# Patient Record
Sex: MALE | Race: WHITE | NOT HISPANIC OR LATINO | Employment: PART TIME
[De-identification: names, ages, dates, MRNs, and addresses within clinical notes are randomized per-mention and may not be internally consistent; named-entity substitution may affect disease eponyms.]

---

## 2017-02-09 ENCOUNTER — RECORDS - HEALTHEAST (OUTPATIENT)
Dept: ADMINISTRATIVE | Facility: OTHER | Age: 57
End: 2017-02-09

## 2017-08-23 ENCOUNTER — OFFICE VISIT - HEALTHEAST (OUTPATIENT)
Dept: FAMILY MEDICINE | Facility: CLINIC | Age: 57
End: 2017-08-23

## 2017-08-23 DIAGNOSIS — M25.579 PAIN IN JOINT, ANKLE AND FOOT, UNSPECIFIED LATERALITY: ICD-10-CM

## 2017-08-23 DIAGNOSIS — Z00.00 HEALTH CARE MAINTENANCE: ICD-10-CM

## 2017-08-23 DIAGNOSIS — N52.9 ED (ERECTILE DYSFUNCTION): ICD-10-CM

## 2017-08-23 DIAGNOSIS — E78.2 MIXED HYPERLIPIDEMIA: ICD-10-CM

## 2017-08-23 ASSESSMENT — MIFFLIN-ST. JEOR: SCORE: 1650.04

## 2017-08-25 ENCOUNTER — AMBULATORY - HEALTHEAST (OUTPATIENT)
Dept: LAB | Facility: CLINIC | Age: 57
End: 2017-08-25

## 2017-08-25 DIAGNOSIS — Z00.00 HEALTH CARE MAINTENANCE: ICD-10-CM

## 2017-08-25 LAB
CHOLEST SERPL-MCNC: 233 MG/DL
FASTING STATUS PATIENT QL REPORTED: YES
HDLC SERPL-MCNC: 45 MG/DL
LDLC SERPL CALC-MCNC: 168 MG/DL
PSA SERPL-MCNC: <0.1 NG/ML (ref 0–3.5)
TRIGL SERPL-MCNC: 98 MG/DL

## 2018-02-13 ENCOUNTER — OFFICE VISIT - HEALTHEAST (OUTPATIENT)
Dept: FAMILY MEDICINE | Facility: CLINIC | Age: 58
End: 2018-02-13

## 2018-02-13 ENCOUNTER — RECORDS - HEALTHEAST (OUTPATIENT)
Dept: GENERAL RADIOLOGY | Facility: CLINIC | Age: 58
End: 2018-02-13

## 2018-02-13 DIAGNOSIS — R68.83 CHILLS: ICD-10-CM

## 2018-02-13 DIAGNOSIS — R68.83 CHILLS (WITHOUT FEVER): ICD-10-CM

## 2018-02-13 DIAGNOSIS — M54.9 DORSALGIA, UNSPECIFIED: ICD-10-CM

## 2018-02-13 DIAGNOSIS — M54.9 BACK PAIN: ICD-10-CM

## 2018-02-13 LAB
BASOPHILS # BLD AUTO: 0.2 THOU/UL (ref 0–0.2)
BASOPHILS NFR BLD AUTO: 3 % (ref 0–2)
EOSINOPHIL COUNT (ABSOLUTE): 0.1 THOU/UL (ref 0–0.4)
EOSINOPHIL NFR BLD AUTO: 3 % (ref 0–6)
ERYTHROCYTE [DISTWIDTH] IN BLOOD BY AUTOMATED COUNT: 12.2 % (ref 11–14.5)
ERYTHROCYTE [SEDIMENTATION RATE] IN BLOOD BY WESTERGREN METHOD: 14 MM/HR (ref 0–15)
HCT VFR BLD AUTO: 44.8 % (ref 40–54)
HGB BLD-MCNC: 14.8 G/DL (ref 14–18)
LYMPHOCYTES # BLD AUTO: 1.5 THOU/UL (ref 0.8–4.4)
LYMPHOCYTES NFR BLD AUTO: 28 % (ref 20–40)
MCH RBC QN AUTO: 29.4 PG (ref 27–34)
MCHC RBC AUTO-ENTMCNC: 33 G/DL (ref 32–36)
MCV RBC AUTO: 89 FL (ref 80–100)
MONOCYTES # BLD AUTO: 1 THOU/UL (ref 0–0.9)
MONOCYTES NFR BLD AUTO: 19 % (ref 2–10)
PLAT MORPH BLD: NORMAL
PLATELET # BLD AUTO: 208 THOU/UL (ref 140–440)
PMV BLD AUTO: 11.1 FL (ref 8.5–12.5)
RBC # BLD AUTO: 5.03 MILL/UL (ref 4.4–6.2)
TOTAL NEUTROPHILS-ABS(DIFF): 2.5 THOU/UL (ref 2–7.7)
TOTAL NEUTROPHILS-REL(DIFF): 48 % (ref 50–70)
WBC: 5.3 THOU/UL (ref 4–11)

## 2019-01-31 ENCOUNTER — OFFICE VISIT - HEALTHEAST (OUTPATIENT)
Dept: FAMILY MEDICINE | Facility: CLINIC | Age: 59
End: 2019-01-31

## 2019-01-31 DIAGNOSIS — Z00.00 HEALTH CARE MAINTENANCE: ICD-10-CM

## 2019-01-31 DIAGNOSIS — M25.579 PAIN IN JOINT INVOLVING ANKLE AND FOOT, UNSPECIFIED LATERALITY: ICD-10-CM

## 2019-01-31 DIAGNOSIS — E78.2 MIXED HYPERLIPIDEMIA: ICD-10-CM

## 2019-01-31 DIAGNOSIS — M72.0 DUPUYTREN CONTRACTURE: ICD-10-CM

## 2019-01-31 DIAGNOSIS — Z85.46 HISTORY OF PROSTATE CANCER: ICD-10-CM

## 2019-01-31 DIAGNOSIS — M15.9 GENERALIZED OA: ICD-10-CM

## 2019-01-31 LAB
ERYTHROCYTE [DISTWIDTH] IN BLOOD BY AUTOMATED COUNT: 11.9 % (ref 11–14.5)
HCT VFR BLD AUTO: 46.5 % (ref 40–54)
HGB BLD-MCNC: 15.6 G/DL (ref 14–18)
MCH RBC QN AUTO: 29.4 PG (ref 27–34)
MCHC RBC AUTO-ENTMCNC: 33.5 G/DL (ref 32–36)
MCV RBC AUTO: 88 FL (ref 80–100)
PLATELET # BLD AUTO: 222 THOU/UL (ref 140–440)
PMV BLD AUTO: 8.3 FL (ref 7–10)
RBC # BLD AUTO: 5.31 MILL/UL (ref 4.4–6.2)
WBC: 7 THOU/UL (ref 4–11)

## 2019-01-31 ASSESSMENT — MIFFLIN-ST. JEOR: SCORE: 1656.17

## 2019-02-01 LAB
ALBUMIN SERPL-MCNC: 4 G/DL (ref 3.5–5)
ALP SERPL-CCNC: 79 U/L (ref 45–120)
ALT SERPL W P-5'-P-CCNC: 37 U/L (ref 0–45)
ANION GAP SERPL CALCULATED.3IONS-SCNC: 9 MMOL/L (ref 5–18)
AST SERPL W P-5'-P-CCNC: 23 U/L (ref 0–40)
BILIRUB SERPL-MCNC: 1 MG/DL (ref 0–1)
BUN SERPL-MCNC: 16 MG/DL (ref 8–22)
CALCIUM SERPL-MCNC: 9.7 MG/DL (ref 8.5–10.5)
CHLORIDE BLD-SCNC: 104 MMOL/L (ref 98–107)
CHOLEST SERPL-MCNC: 249 MG/DL
CO2 SERPL-SCNC: 26 MMOL/L (ref 22–31)
CREAT SERPL-MCNC: 1.09 MG/DL (ref 0.7–1.3)
FASTING STATUS PATIENT QL REPORTED: ABNORMAL
GFR SERPL CREATININE-BSD FRML MDRD: >60 ML/MIN/1.73M2
GLUCOSE BLD-MCNC: 83 MG/DL (ref 70–125)
HDLC SERPL-MCNC: 48 MG/DL
LDLC SERPL CALC-MCNC: 173 MG/DL
POTASSIUM BLD-SCNC: 4.5 MMOL/L (ref 3.5–5)
PROT SERPL-MCNC: 7.3 G/DL (ref 6–8)
PSA SERPL-MCNC: <0.1 NG/ML (ref 0–4.5)
SODIUM SERPL-SCNC: 139 MMOL/L (ref 136–145)
TRIGL SERPL-MCNC: 142 MG/DL

## 2019-02-05 ENCOUNTER — AMBULATORY - HEALTHEAST (OUTPATIENT)
Dept: FAMILY MEDICINE | Facility: CLINIC | Age: 59
End: 2019-02-05

## 2019-02-05 RX ORDER — ATORVASTATIN CALCIUM 20 MG/1
20 TABLET, FILM COATED ORAL DAILY
Qty: 30 TABLET | Refills: 11 | Status: SHIPPED | OUTPATIENT
Start: 2019-02-05 | End: 2023-03-29

## 2019-07-15 ENCOUNTER — COMMUNICATION - HEALTHEAST (OUTPATIENT)
Dept: UROLOGY | Facility: CLINIC | Age: 59
End: 2019-07-15

## 2019-07-17 ENCOUNTER — COMMUNICATION - HEALTHEAST (OUTPATIENT)
Dept: UROLOGY | Facility: CLINIC | Age: 59
End: 2019-07-17

## 2020-10-30 ENCOUNTER — OFFICE VISIT - HEALTHEAST (OUTPATIENT)
Dept: FAMILY MEDICINE | Facility: CLINIC | Age: 60
End: 2020-10-30

## 2020-10-30 DIAGNOSIS — G56.03 BILATERAL CARPAL TUNNEL SYNDROME: ICD-10-CM

## 2020-10-30 DIAGNOSIS — M25.50 MULTIPLE JOINT PAIN: ICD-10-CM

## 2020-10-30 DIAGNOSIS — Z12.11 COLON CANCER SCREENING: ICD-10-CM

## 2020-10-30 DIAGNOSIS — E78.2 MIXED HYPERLIPIDEMIA: ICD-10-CM

## 2020-10-30 DIAGNOSIS — Z00.00 ROUTINE HISTORY AND PHYSICAL EXAMINATION OF ADULT: ICD-10-CM

## 2020-10-30 DIAGNOSIS — C61 MALIGNANT NEOPLASM OF PROSTATE (H): ICD-10-CM

## 2020-10-30 DIAGNOSIS — R00.2 PALPITATIONS: ICD-10-CM

## 2020-10-30 LAB
ALBUMIN SERPL-MCNC: 4 G/DL (ref 3.5–5)
ALP SERPL-CCNC: 73 U/L (ref 45–120)
ALT SERPL W P-5'-P-CCNC: 26 U/L (ref 0–45)
ANION GAP SERPL CALCULATED.3IONS-SCNC: 10 MMOL/L (ref 5–18)
AST SERPL W P-5'-P-CCNC: 24 U/L (ref 0–40)
BILIRUB SERPL-MCNC: 0.7 MG/DL (ref 0–1)
BUN SERPL-MCNC: 21 MG/DL (ref 8–22)
CALCIUM SERPL-MCNC: 9.1 MG/DL (ref 8.5–10.5)
CHLORIDE BLD-SCNC: 105 MMOL/L (ref 98–107)
CHOLEST SERPL-MCNC: 219 MG/DL
CO2 SERPL-SCNC: 22 MMOL/L (ref 22–31)
CREAT SERPL-MCNC: 1.1 MG/DL (ref 0.7–1.3)
ERYTHROCYTE [DISTWIDTH] IN BLOOD BY AUTOMATED COUNT: 12 % (ref 11–14.5)
FASTING STATUS PATIENT QL REPORTED: YES
GFR SERPL CREATININE-BSD FRML MDRD: >60 ML/MIN/1.73M2
GLUCOSE BLD-MCNC: 91 MG/DL (ref 70–125)
HCT VFR BLD AUTO: 45.4 % (ref 40–54)
HDLC SERPL-MCNC: 55 MG/DL
HGB BLD-MCNC: 15.3 G/DL (ref 14–18)
LDLC SERPL CALC-MCNC: 142 MG/DL
MCH RBC QN AUTO: 29.6 PG (ref 27–34)
MCHC RBC AUTO-ENTMCNC: 33.7 G/DL (ref 32–36)
MCV RBC AUTO: 88 FL (ref 80–100)
PLATELET # BLD AUTO: 198 THOU/UL (ref 140–440)
PMV BLD AUTO: 8.8 FL (ref 7–10)
POTASSIUM BLD-SCNC: 4.6 MMOL/L (ref 3.5–5)
PROT SERPL-MCNC: 7.2 G/DL (ref 6–8)
PSA SERPL-MCNC: <0.1 NG/ML (ref 0–4.5)
RBC # BLD AUTO: 5.16 MILL/UL (ref 4.4–6.2)
SODIUM SERPL-SCNC: 137 MMOL/L (ref 136–145)
TRIGL SERPL-MCNC: 111 MG/DL
WBC: 5.4 THOU/UL (ref 4–11)

## 2020-10-30 ASSESSMENT — MIFFLIN-ST. JEOR: SCORE: 1624.53

## 2020-11-02 ENCOUNTER — COMMUNICATION - HEALTHEAST (OUTPATIENT)
Dept: FAMILY MEDICINE | Facility: CLINIC | Age: 60
End: 2020-11-02

## 2020-11-03 LAB
ANA SER QL: 0.2 U
DNA (DS) ANTIBODY - HISTORICAL: 3 IU

## 2020-11-11 ENCOUNTER — RECORDS - HEALTHEAST (OUTPATIENT)
Dept: ADMINISTRATIVE | Facility: OTHER | Age: 60
End: 2020-11-11

## 2020-11-11 LAB — COLOGUARD-ABSTRACT: NEGATIVE

## 2020-11-24 ENCOUNTER — COMMUNICATION - HEALTHEAST (OUTPATIENT)
Dept: FAMILY MEDICINE | Facility: CLINIC | Age: 60
End: 2020-11-24

## 2020-11-25 ENCOUNTER — RECORDS - HEALTHEAST (OUTPATIENT)
Dept: HEALTH INFORMATION MANAGEMENT | Facility: CLINIC | Age: 60
End: 2020-11-25

## 2020-12-12 ENCOUNTER — COMMUNICATION - HEALTHEAST (OUTPATIENT)
Dept: FAMILY MEDICINE | Facility: CLINIC | Age: 60
End: 2020-12-12

## 2021-05-30 NOTE — TELEPHONE ENCOUNTER
Message left for patient to call clinic to set up an appointment for kidney stone follow-up for today or tomorrow.  Shannan Gonzalez RN

## 2021-05-31 VITALS — HEIGHT: 72 IN | BODY MASS INDEX: 24.52 KG/M2 | WEIGHT: 181 LBS

## 2021-05-31 VITALS — BODY MASS INDEX: 25.27 KG/M2 | WEIGHT: 185 LBS

## 2021-06-02 VITALS — BODY MASS INDEX: 24.65 KG/M2 | HEIGHT: 72 IN | WEIGHT: 182 LBS

## 2021-06-04 VITALS
WEIGHT: 178 LBS | SYSTOLIC BLOOD PRESSURE: 138 MMHG | RESPIRATION RATE: 14 BRPM | TEMPERATURE: 98.4 F | HEIGHT: 71 IN | DIASTOLIC BLOOD PRESSURE: 78 MMHG | HEART RATE: 54 BPM | BODY MASS INDEX: 24.92 KG/M2

## 2021-06-05 ENCOUNTER — RECORDS - HEALTHEAST (OUTPATIENT)
Dept: LAB | Facility: CLINIC | Age: 61
End: 2021-06-05

## 2021-06-05 DIAGNOSIS — R42 DIZZINESS AND GIDDINESS: ICD-10-CM

## 2021-06-12 NOTE — PROGRESS NOTES
Assessment:      Healthy male exam.    HCM  Hx of prostate cancer  Hyperlipidemia  Joint pain  Carpal tunnel  palpitations  Plan:       All questions answered.    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 male who presents for an annual exam. Pt is here for annual exam. In need of fasting labs.  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  He has problems with carpal tunnel BL often- discussed bracing.  Discussed options of injections or hand surgery as well.  He has had a couple of episodes of palpitations in the past as well- short in duration- maybe some dizziness with the episodes- discussed the need to be seen if this happens so we can get EKG- discussed if the episodes are short but happening more often than a couple times a year we could consider weekly monitor.  Pt is on statin- check cholesterol levels today.  Follow PSA- hx of prostate cancer.    Healthy Habits:   Reular Exercise: Yes  Healthy Diet: Yes  Dental Visits Regularly: Yes  Lipid Profile: Yes  Glucose Screen: Yes        Immunization History   Administered Date(s) Administered     DT (pediatric) 04/18/2000     Td, adult adsorbed, PF 08/16/2018     Tdap 09/24/2008      Immunization status: up to date and documented.    No exam data present    Current Outpatient Medications   Medication Sig Dispense Refill     atorvastatin (LIPITOR) 20 MG tablet Take 1 tablet (20 mg total) by mouth daily. 30 tablet 11     No current facility-administered medications for this visit.      Past Medical History:   Diagnosis Date     Cancer (H)     Prostate     Hyperlipidemia      Psoriasis      Past Surgical History:   Procedure Laterality Date     LAPAROSCOPIC RETROPUBIC PROSTATECTOMY  2015     [place] LAP,PROSTATECTOMY,RADICAL,W/NERVE SPARE,INCL ROBOTIC Bilateral 2015    Procedure: DAVINCI RADICAL RETROPUBIC PROSTATECTOMY, BILATERAL PELVIC LYMPH NODE DISSECTION;  Surgeon: Jelani Landa MD;  [facility];  Service: Urology     Patient has no known allergies.  Family History   Problem Relation Age of Onset     Multiple myeloma Mother      Heart disease Father      Social History     Socioeconomic History     Marital status:      Spouse name: Not on file     Number of children: Not on file     Years of education: Not on file     Highest education level: Not on file   Occupational History     Not on file   Social Needs     Financial resource strain: Not on file     Food insecurity     Worry: Not on file     Inability: Not on file     Transportation needs     Medical: Not on file     Non-medical: Not on file   Tobacco Use     Smoking status: Former Smoker     Quit date: 1994     Years since quittin.8     Smokeless tobacco: Former User   Substance and Sexual Activity     Alcohol use: No     Drug use: Not on file     Sexual activity: Not on file   Lifestyle     Physical activity     Days per week: Not on file     Minutes per session: Not on file     Stress: Not on file   Relationships     Social connections     Talks on phone: Not on file     Gets together: Not on file     Attends Sikh service: Not on file     Active member of club or organization: Not on  "file     Attends meetings of clubs or organizations: Not on file     Relationship status: Not on file     Intimate partner violence     Fear of current or ex partner: Not on file     Emotionally abused: Not on file     Physically abused: Not on file     Forced sexual activity: Not on file   Other Topics Concern     Not on file   Social History Narrative     Not on file       Review of Systems  General:  Denies problem  Eyes: Denies problem  Ears/Nose/Throat: Denies problem  Cardiovascular: Denies problem  Respiratory:  Denies problem  Gastrointestinal:  Denies problem  Genitourinary: Denies problem  Musculoskeletal:  Denies problem  Skin: Denies problem  Neurologic: Denies problem  Psychiatric: Denies problem  Endocrine: Denies problem  Heme/Lymphatic: Denies problem   Allergic/Immunologic: Denies problem        Objective:     Vitals:    10/30/20 0910 10/30/20 0950   BP: 142/90 138/78   Pulse: (!) 54    Resp: 14    Temp: 98.4  F (36.9  C)    TempSrc: Oral    Weight: 178 lb (80.7 kg)    Height: 5' 11\" (1.803 m)      Body mass index is 24.83 kg/m .    Physical  General Appearance: Alert, cooperative, no distress, appears stated age  Head: Normocephalic, without obvious abnormality, atraumatic  Eyes: PERRL, conjunctiva/corneas clear, EOM's intact  Ears: Normal TM's and external ear canals, both ears  Nose: Nares normal, septum midline,mucosa normal, no drainage  Back: Symmetric, no curvature, ROM normal, no CVA tenderness  Lungs: Clear to auscultation bilaterally, respirations unlabored  Heart: Regular rate and rhythm, S1 and S2 normal, no murmur, rub, or gallop,  Abdomen: Soft, non-tender, bowel sounds active all four quadrants,  no masses, no organomegaly   Musculoskeletal: Normal range of motion. No joint swelling or deformity.   Extremities: Extremities normal, atraumatic, no cyanosis or edema  Skin: Skin color, texture, turgor normal, no rashes or lesions  Neurologic: He is alert. He has normal reflexes. "   Psychiatric: He has a normal mood and affect.

## 2021-06-12 NOTE — PROGRESS NOTES
Assessment:      Healthy male exam.    ED  Hyperlipidemia  Hist of prostate cancer  Plan:       All questions answered.   ED-tried multiple medications since his da Kana procedure for prostate cancer and has not had improvement and has ongoing problems with erectile dysfunction is not interested in other medications at this time  Hyperlipidemia-due for fasting labs today he will return when he is fasting will follow based on results  Hist of prostate cancer-he discusses frustrations status post surgical correction of prostate cancer he is due for PSA testing  HCM-is up-to-date on immunizations  He will return for fasting labs  Ankle pain-ongoing problems with ankle pain discuss options for referral to orthopedics for further evaluation and treatment he is not interested at this time he will continue to monitor but is concerned obviously with the variability and presentation of his symptoms are happening in the future causing injury  Subjective:      Jaydon Mancia is a 57 y.o. male who presents for an annual exam. The patient reports that there is not domestic violence in his life.  Patient is here for yearly physical.  Is up-to-date on immunizations.  Is up-to-date on colonoscopy.  He is not fasting and will return for fasting labs.  Is a history of elevated cholesterol levels and will check levels when he returns in follow-up based on results.  He discusses frustration with ongoing problems with erectile dysfunction and is due for PSA testing today has been a year since his last check.  He is overdue.  He continues to have problems with his ankle periodically giving out on him which I was occasionally allowed him to fall-he has tried over the last year strengthening exercises for his ankle without any improvement.  He has time periods where he does not have any symptoms at all and sometimes he has more of a problem with his ankle but has not been a little associated a consistent pattern.  We offer referral to  orthopedic specialty for his ankle to help improve symptoms but he declines at this time will think about it for the future.    Healthy Habits:   Regular Exercise: Yes  Sunscreen Use: Yes  Healthy Diet: Yes  Dental Visits Regularly: Yes  Seat Belt: Yes  Sexually active: No  Colonoscopy: Yes  Lipid Profile: Yes  Glucose Screen: Yes      Immunization History   Administered Date(s) Administered     DT (pediatric) 04/18/2000     Tdap 09/24/2008     Immunization status: up to date and documented.    No exam data present    Current Outpatient Prescriptions   Medication Sig Dispense Refill     tadalafil (CIALIS) 5 MG tablet Take 1 tablet (5 mg total) by mouth daily.       No current facility-administered medications for this visit.      Past Medical History:   Diagnosis Date     Cancer     Prostate     Hyperlipidemia      Psoriasis      Past Surgical History:   Procedure Laterality Date     LAPAROSCOPIC RETROPUBIC PROSTATECTOMY  01/27/2015     [place] LAP,PROSTATECTOMY,RADICAL,W/NERVE SPARE,INCL ROBOTIC Bilateral 1/27/2015    Procedure: DAVINCI RADICAL RETROPUBIC PROSTATECTOMY, BILATERAL PELVIC LYMPH NODE DISSECTION;  Surgeon: Jelani Landa MD;  Location: [facility];  Service: Urology     Review of patient's allergies indicates no known allergies.  No family history on file.  Social History     Social History     Marital status:      Spouse name: N/A     Number of children: N/A     Years of education: N/A     Occupational History     Not on file.     Social History Main Topics     Smoking status: Former Smoker     Quit date: 1/1/1994     Smokeless tobacco: Former User     Alcohol use No     Drug use: Not on file     Sexual activity: Not on file     Other Topics Concern     Not on file     Social History Narrative       Review of Systems  General:  Denies problem  Eyes: Denies problem  Ears/Nose/Throat: Denies problem  Cardiovascular: Denies problem  Respiratory:  Denies problem  Gastrointestinal:  Denies  "problem  Genitourinary: Denies problem  Musculoskeletal:  Denies problem  Skin: Denies problem  Neurologic: Denies problem  Psychiatric: Denies problem  Endocrine: Denies problem  Heme/Lymphatic: Denies problem   Allergic/Immunologic: Denies problem        Objective:     Vitals:    08/23/17 1400   BP: 116/68   Pulse: 62   Resp: 16   Temp: 98.6  F (37  C)   TempSrc: Oral   Weight: 181 lb (82.1 kg)   Height: 5' 11.75\" (1.822 m)     Body mass index is 24.72 kg/(m^2).    Physical  General Appearance: Alert, cooperative, no distress, appears stated age  Head: Normocephalic, without obvious abnormality, atraumatic  Eyes: PERRL, conjunctiva/corneas clear, EOM's intact  Ears: Normal TM's and external ear canals, both ears  Nose: Nares normal, septum midline,mucosa normal, no drainage  Throat: Lips, mucosa, and tongue normal; teeth and gums normal  Neck: Supple, symmetrical, trachea midline, no adenopathy;  thyroid: not enlarged, symmetric, no tenderness/mass/nodules; no carotid bruit or JVD  Back: Symmetric, no curvature, ROM normal, no CVA tenderness  Lungs: Clear to auscultation bilaterally, respirations unlabored  Heart: Regular rate and rhythm, S1 and S2 normal, no murmur, rub, or gallop,  Abdomen: Soft, non-tender, bowel sounds active all four quadrants,  no masses, no organomegaly  Genitourinary: no concerns  Musculoskeletal: Normal range of motion. No joint swelling or deformity.   Extremities: Extremities normal, atraumatic, no cyanosis or edema-no abnormalities noted in ankle examination.  Skin: Skin color, texture, turgor normal, no rashes or lesions  Lymph nodes: Cervical, supraclavicular, and axillary nodes normal  Neurologic: He is alert. He has normal reflexes.   Psychiatric: He has a normal mood and affect.            "

## 2021-06-12 NOTE — PATIENT INSTRUCTIONS - HE
Check with insurance on coverage for the new shingles vaccine  - look for ankle braces for work  - carpal tunnel braces for wrist  -if dizziness spells are increasing contact Dr. Luciano for monitor

## 2021-06-16 NOTE — PROGRESS NOTES
ASSESSMENT/PLAN  1. Back pain  Clinical symptoms and history of sciatica/radiculopathy on the right side from the right SI joint radiating down the leg  No acute trauma the patient expressing symptoms of chills will rule out infection  Hemogram showing no elevation of neutrophils or white blood cell count and sed rate was normal today in clinic  X-ray finding no acute abnormalities on imaging reviewed with the patient today will send to radiology as well  We will start the patient on a course of prednisone 20 mg 2 tablets daily for 4 days and 1 tablet for 6  He will contact me if symptoms are not improving or if there is any worsening or new symptoms that develop  - Sedimentation Rate  - HM1(CBC and Differential)  - XR Hip Right 2 or More VWS; Future  - HM1 (CBC with Diff)  - Manual Differential    2. Chills  Reviewed imaging and laboratory work with him today  We will start prednisone as stated above  Patient does have history of prostate cancer-recent PSA testing within the last 6 months was nondetectable no lesions noted on x-ray imaging will send to radiology for final read  - Sedimentation Rate  - HM1(CBC and Differential)  - XR Hip Right 2 or More VWS; Future  - HM1 (CBC with Diff)  - Manual Differential        SUBJECTIVE:   Chief Complaint   Patient presents with     Sciatica     Right sided pain in the buttocks radiating into the posterior right leg X3 days      Back Pain     Intermittent bilateral lower back pain     Chills     Getting the chills within the past month, last 30-60 minutes at a time     Jaydon Mancia 58 y.o. male    Current Outpatient Prescriptions   Medication Sig Dispense Refill     predniSONE (DELTASONE) 20 MG tablet Take two tablets daily x 4 days then one tablet daily x 6 14 tablet 0     tadalafil (CIALIS) 5 MG tablet Take 1 tablet (5 mg total) by mouth daily.       No current facility-administered medications for this visit.      Allergies: Review of patient's allergies indicates no  known allergies.   No LMP for male patient.    HPI:   Patient presenting with right-sided SI joint and sciatica pain radiating down his right leg with no acute trauma.  He works a physical intensive job in construction but had no acute trauma to his knowledge.  He is in the past has had very low levels of back pain prior with no relief similar symptoms.  He has been experiencing a large amount of pain over the last few days and getting chills as well.  No fevers.  Has a history of prostate cancer recently at less than 6 months ago and had an undetectable PSA level.  Discussed with him multiple different possible etiologies leading to the back pain with radiculopathy.  With his chills and sudden onset discussed with even the possibility of infection.  We discussed laboratory work today which is showing no acute signs of infection.  We will obtain imaging which were reviewed with him showing no acute lytic lesions or malalignment of the hip.  We discussed with him a trial course of prednisone to help with inflammatory changes causing symptoms which is in agreement.  We will start him on prednisone 40 mg for 4 days and then 20 mg for 6 days after that.  If no improvement of symptoms will need to further evaluate likely with MRI.  Discussed with any change in symptoms or new symptoms developing to contact me sooner.  Discussed with patient possible need for pain medication which he declined today.    ROS: negative except as per HPI    OBJECTIVE:   The patient appears well, alert, oriented x 3, in no distress.  /70 (Patient Site: Right Arm, Patient Position: Sitting, Cuff Size: Adult Regular)  Pulse 74  Temp 98.3  F (36.8  C) (Oral)   Resp 14  Wt 185 lb (83.9 kg)  BMI 25.27 kg/m2    Lungs: clear, good air entry, no wheezes, rhonchi or rales.   Cardiac: S1 and S2 normal, no murmurs, regular rate and rhythm.   Abdomen: normal bowel sounds, soft   Extremities: show no edema, normal peripheral pulses.    Neurological: Patient sitting more comfortably than standing with stand has right-sided radiculopathy and sciatica from his SI joint down-he has normal range of motion his right lower extremity  Skin: clear, dry, no rashes/lesions  Psych- normal mood and affect      Pt states an understanding and agrees to the above plan.  Greater than 25 minutes was spent today in interview and examination with Jaydon Mancia with more than 50% of that time in counseling and coordination of care.

## 2021-06-21 NOTE — LETTER
Letter by Andriy Rider MD at      Author: Andriy Rider MD Service: -- Author Type: --    Filed:  Encounter Date: 11/24/2020 Status: (Other)         Jaydon Mancia  [address] [place] [address]             November 24, 2020         Dear Mr. Mancia,    Below are the results from your recent visit:    No results found from the In Basket message.      Please call with questions or contact us using [employer].    Sincerely,        Electronically signed by Andriy Rider MD

## 2021-06-21 NOTE — LETTER
Letter by Andriy Rider MD at      Author: Andriy Rider MD Service: -- Author Type: --    Filed:  Encounter Date: 11/24/2020 Status: (Other)         Jaydon Mancia  [address] [place] [id number]             November 25, 2020         Dear Mr. Mancia,    Below are the results from your recent visit:    Your Cologuard testing of a stool sample collected 11-11-20 is NEGATIVE.  Cologuard testing is recommended every 3 years.    Please call with questions or contact us using [employer].    Sincerely,        Electronically signed by Andriy Rider MD

## 2021-06-23 NOTE — PROGRESS NOTES
Assessment:      Healthy male exam.    [facility]  Dupuytren contracture  Mixed hyperlipidemia  OA- ankle pain  Hand pain  Hx of prostate cancer  Plan:       All questions answered.    [facility]-discussed the new shingles vaccine, patient up-to-date on colon cancer screening, is fasting today and interested in fasting labs  Dupuytren contracture-patient has a contracture on his fourth digit discussed diagnosis and stretching techniques-discussed possibility of surgery for correction if needed  Mixed hyperlipidemia-discussed patient's elevated cholesterol levels, reviewed his 10-year cardiovascular risk-discussed starting statin medication if levels are not improved  OA- ankle pain-patient has suspected osteoarthritis of the ankles and lax ligaments that sometimes allow his ankles to roll discussed with him ankle bracing 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 male who presents for an annual exam. The patient reports that there is not domestic violence in his life.  Patient is here for annual exam-he has a few other concerns including his ankles that sometimes give out on him, ankle pain and hand pain.  He has signs of osteoarthritis in his hands and ankles.  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  Reviewed his elevated cholesterol levels last couple years and his 10-year cardiovascular risk which is up at about  12%.  We discussed with him starting a statin medication of his cholesterol levels do not improve.  Is a strong family history of coronary artery disease-I think he would be amendable to starting a statin if his cholesterol levels remain elevated.  He is no longer following with urology has a history of prostate cancer and prostatectomy a few years ago we are checking PSA for monitoring.  He is up-to-date on immunizations  Discussed the new shingles vaccine  Discussed his previous colonoscopy results and the possibility of doing Cologuard in the future    Healthy Habits:   Regular Exercise: Yes  Sunscreen Use: Yes  Healthy Diet: Yes  Dental Visits Regularly: Yes  Seat Belt: Yes  Colonoscopy: Yes  Lipid Profile: Yes  Glucose Screen: Yes        Immunization History   Administered Date(s) Administered     DT (pediatric) 04/18/2000     Td, adult adsorbed, PF 08/16/2018     Tdap 09/24/2008     Immunization status: up to date and documented.    No exam data present    No current outpatient medications on file.     No current facility-administered medications for this visit.      Past Medical History:   Diagnosis Date     Cancer (H)     Prostate     Hyperlipidemia      Psoriasis      Past Surgical History:   Procedure Laterality Date     LAPAROSCOPIC RETROPUBIC PROSTATECTOMY  01/27/2015     SC LAP,PROSTATECTOMY,RADICAL,W/NERVE SPARE,INCL ROBOTIC Bilateral 1/27/2015    Procedure: DAVINCI RADICAL RETROPUBIC PROSTATECTOMY, BILATERAL PELVIC LYMPH NODE DISSECTION;  Surgeon: Jelani Landa MD;  Location: [facility];  Service: Urology     Patient has no known allergies.  Family History   Problem Relation Age of Onset     Multiple myeloma Mother      Heart disease Father      Social History     Socioeconomic History     Marital status:      Spouse name: Not on file     Number of children: Not on file     Years of education: Not on file     Highest education level: Not on file   Social Needs     Financial  "resource strain: Not on file     Food insecurity - worry: Not on file     Food insecurity - inability: Not on file     Transportation needs - medical: Not on file     Transportation needs - non-medical: Not on file   Occupational History     Not on file   Tobacco Use     Smoking status: Former Smoker     Last attempt to quit: 1994     Years since quittin.0     Smokeless tobacco: Former User   Substance and Sexual Activity     Alcohol use: No     Drug use: Not on file     Sexual activity: Not on file   Other Topics Concern     Not on file   Social History Narrative     Not on file       Review of Systems  General:  Denies problem  Eyes: Denies problem  Ears/Nose/Throat: Denies problem  Cardiovascular: Denies problem  Respiratory:  Denies problem  Gastrointestinal:  Denies problem  Genitourinary: Denies problem  Musculoskeletal:  Denies problem  Skin: Denies problem  Neurologic: Denies problem  Psychiatric: Denies problem  Endocrine: Denies problem  Heme/Lymphatic: Denies problem   Allergic/Immunologic: Denies problem        Objective:     Vitals:    19 0902 19 0909   BP: 140/84 129/85   Pulse: 62 60   Resp: 16    Temp: 97.7  F (36.5  C)    TempSrc: Oral    Weight: 182 lb (82.6 kg)    Height: 5' 11.85\" (1.825 m)      Body mass index is 24.79 kg/m .    Physical  General Appearance: Alert, cooperative, no distress, appears stated age  Head: Normocephalic, without obvious abnormality, atraumatic  Eyes: PERRL, conjunctiva/corneas clear, EOM's intact  Ears: Normal TM's and external ear canals, both ears  Nose: Nares normal, septum midline,mucosa normal, no drainage  Throat: Lips, mucosa, and tongue normal; teeth and gums normal  Neck: Supple, symmetrical, trachea midline, no adenopathy;  thyroid: not enlarged, symmetric, no tenderness/mass/nodules; no carotid bruit or JVD  Back: Symmetric, no curvature, ROM normal, no CVA tenderness  Lungs: Clear to auscultation bilaterally, respirations " unlabored  Heart: Regular rate and rhythm, S1 and S2 normal, no murmur, rub, or gallop,  Abdomen: Soft, non-tender, bowel sounds active all four quadrants,  no masses, no organomegaly  Genitourinary: deferred, no concerns   Musculoskeletal: Normal range of motion. No joint swelling or deformity.   Extremities: Extremities normal, atraumatic, no cyanosis or edema-bilateral hand pain and Heberden nodules noted, left hand fourth digit with contracture noted  Skin: Skin color, texture, turgor normal, no rashes or lesions  Lymph nodes: Cervical, supraclavicular, and axillary nodes normal  Neurologic: He is alert. He has normal reflexes.   Psychiatric: He has a normal mood and affect.

## 2021-06-26 ENCOUNTER — HEALTH MAINTENANCE LETTER (OUTPATIENT)
Age: 61
End: 2021-06-26

## 2021-10-16 ENCOUNTER — HEALTH MAINTENANCE LETTER (OUTPATIENT)
Age: 61
End: 2021-10-16

## 2021-12-11 ENCOUNTER — HEALTH MAINTENANCE LETTER (OUTPATIENT)
Age: 61
End: 2021-12-11

## 2022-05-05 ENCOUNTER — TRANSFERRED RECORDS (OUTPATIENT)
Dept: HEALTH INFORMATION MANAGEMENT | Facility: CLINIC | Age: 62
End: 2022-05-05
Payer: COMMERCIAL

## 2022-05-12 PROBLEM — D12.6 COLONIC ADENOMA: Status: ACTIVE | Noted: 2022-05-12

## 2022-09-25 ENCOUNTER — HEALTH MAINTENANCE LETTER (OUTPATIENT)
Age: 62
End: 2022-09-25

## 2023-03-29 ENCOUNTER — OFFICE VISIT (OUTPATIENT)
Dept: FAMILY MEDICINE | Facility: CLINIC | Age: 63
End: 2023-03-29
Payer: COMMERCIAL

## 2023-03-29 VITALS
OXYGEN SATURATION: 99 % | SYSTOLIC BLOOD PRESSURE: 133 MMHG | BODY MASS INDEX: 24.72 KG/M2 | RESPIRATION RATE: 14 BRPM | HEART RATE: 62 BPM | HEIGHT: 71 IN | DIASTOLIC BLOOD PRESSURE: 83 MMHG | WEIGHT: 176.6 LBS

## 2023-03-29 DIAGNOSIS — Z85.46 HISTORY OF PROSTATE CANCER: ICD-10-CM

## 2023-03-29 DIAGNOSIS — D12.6 COLONIC ADENOMA: ICD-10-CM

## 2023-03-29 DIAGNOSIS — Z00.00 ENCOUNTER FOR ANNUAL WELLNESS EXAM IN MEDICARE PATIENT: Primary | ICD-10-CM

## 2023-03-29 DIAGNOSIS — Z23 NEED FOR VACCINATION FOR STREP PNEUMONIAE: ICD-10-CM

## 2023-03-29 LAB
ALBUMIN SERPL BCG-MCNC: 4.5 G/DL (ref 3.5–5.2)
ALP SERPL-CCNC: 76 U/L (ref 40–129)
ALT SERPL W P-5'-P-CCNC: 42 U/L (ref 10–50)
ANION GAP SERPL CALCULATED.3IONS-SCNC: 10 MMOL/L (ref 7–15)
AST SERPL W P-5'-P-CCNC: 29 U/L (ref 10–50)
BILIRUB SERPL-MCNC: 0.7 MG/DL
BUN SERPL-MCNC: 20.2 MG/DL (ref 8–23)
CALCIUM SERPL-MCNC: 9.3 MG/DL (ref 8.8–10.2)
CHLORIDE SERPL-SCNC: 106 MMOL/L (ref 98–107)
CHOLEST SERPL-MCNC: 218 MG/DL
CREAT SERPL-MCNC: 1.18 MG/DL (ref 0.67–1.17)
DEPRECATED HCO3 PLAS-SCNC: 23 MMOL/L (ref 22–29)
ERYTHROCYTE [DISTWIDTH] IN BLOOD BY AUTOMATED COUNT: 12.6 % (ref 10–15)
GFR SERPL CREATININE-BSD FRML MDRD: 68 ML/MIN/1.73M2
GLUCOSE SERPL-MCNC: 101 MG/DL (ref 70–99)
HCT VFR BLD AUTO: 45.1 % (ref 40–53)
HDLC SERPL-MCNC: 48 MG/DL
HGB BLD-MCNC: 15.5 G/DL (ref 13.3–17.7)
LDLC SERPL CALC-MCNC: 150 MG/DL
MCH RBC QN AUTO: 29.6 PG (ref 26.5–33)
MCHC RBC AUTO-ENTMCNC: 34.4 G/DL (ref 31.5–36.5)
MCV RBC AUTO: 86 FL (ref 78–100)
NONHDLC SERPL-MCNC: 170 MG/DL
PLATELET # BLD AUTO: 253 10E3/UL (ref 150–450)
POTASSIUM SERPL-SCNC: 4.4 MMOL/L (ref 3.4–5.3)
PROT SERPL-MCNC: 7.5 G/DL (ref 6.4–8.3)
PSA SERPL DL<=0.01 NG/ML-MCNC: 0.02 NG/ML (ref 0–4.5)
RBC # BLD AUTO: 5.23 10E6/UL (ref 4.4–5.9)
SODIUM SERPL-SCNC: 139 MMOL/L (ref 136–145)
TRIGL SERPL-MCNC: 102 MG/DL
WBC # BLD AUTO: 6.2 10E3/UL (ref 4–11)

## 2023-03-29 PROCEDURE — 84153 ASSAY OF PSA TOTAL: CPT | Performed by: FAMILY MEDICINE

## 2023-03-29 PROCEDURE — 85027 COMPLETE CBC AUTOMATED: CPT | Performed by: FAMILY MEDICINE

## 2023-03-29 PROCEDURE — 99397 PER PM REEVAL EST PAT 65+ YR: CPT | Mod: 25 | Performed by: FAMILY MEDICINE

## 2023-03-29 PROCEDURE — 90677 PCV20 VACCINE IM: CPT | Performed by: FAMILY MEDICINE

## 2023-03-29 PROCEDURE — 90471 IMMUNIZATION ADMIN: CPT | Performed by: FAMILY MEDICINE

## 2023-03-29 PROCEDURE — 80053 COMPREHEN METABOLIC PANEL: CPT | Performed by: FAMILY MEDICINE

## 2023-03-29 PROCEDURE — 36415 COLL VENOUS BLD VENIPUNCTURE: CPT | Performed by: FAMILY MEDICINE

## 2023-03-29 PROCEDURE — 80061 LIPID PANEL: CPT | Performed by: FAMILY MEDICINE

## 2023-03-29 ASSESSMENT — ENCOUNTER SYMPTOMS: DIZZINESS: 1

## 2023-03-29 ASSESSMENT — ACTIVITIES OF DAILY LIVING (ADL): CURRENT_FUNCTION: NO ASSISTANCE NEEDED

## 2023-03-29 NOTE — LETTER
March 29, 2023      Jaydon Mancia  [address] [place] [id number]        Dear ,  We are writing to inform you of your test results.    Hi Fito:  Your PSA is 0.02 which is very low though still detectable.  In the past the numbers had been reported as <0.1 and then last year as <0.03.  With the reporting now of very low numbers I would consider a recheck of the PSA in 3 months to verify stability of the PSA.  I will enter the lab order.    With a cholesterol of 218 and  you could consider resumption of statin therapy because of your strong family history of heart disease. Let me know if you would like to do that.  We would then recheck fasting lipids and a liver enzyme in 8 weeks.    Your remaining labs are normal.    Resulted Orders   Comprehensive metabolic panel (BMP + Alb, Alk Phos, ALT, AST, Total. Bili, TP)   Result Value Ref Range    Sodium 139 136 - 145 mmol/L    Potassium 4.4 3.4 - 5.3 mmol/L    Chloride 106 98 - 107 mmol/L    Carbon Dioxide (CO2) 23 22 - 29 mmol/L    Anion Gap 10 7 - 15 mmol/L    Urea Nitrogen 20.2 8.0 - 23.0 mg/dL    Creatinine 1.18 (H) 0.67 - 1.17 mg/dL    Calcium 9.3 8.8 - 10.2 mg/dL    Glucose 101 (H) 70 - 99 mg/dL    Alkaline Phosphatase 76 40 - 129 U/L    AST 29 10 - 50 U/L    ALT 42 10 - 50 U/L    Protein Total 7.5 6.4 - 8.3 g/dL    Albumin 4.5 3.5 - 5.2 g/dL    Bilirubin Total 0.7 <=1.2 mg/dL    GFR Estimate 68 >60 mL/min/1.73m2      Comment:      eGFR calculated using 2021 CKD-EPI equation.   Lipid panel   Result Value Ref Range    Cholesterol 218 (H) <200 mg/dL    Triglycerides 102 <150 mg/dL    Direct Measure HDL 48 >=40 mg/dL    LDL Cholesterol Calculated 150 (H) <=100 mg/dL    Non HDL Cholesterol 170 (H) <130 mg/dL    Narrative    Cholesterol  Desirable:  <200 mg/dL    Triglycerides  Normal:  Less than 150 mg/dL  Borderline High:  150-199 mg/dL  High:  200-499 mg/dL  Very High:  Greater than or equal to 500 mg/dL    Direct Measure HDL  Female:   Greater than or equal to 50 mg/dL   Male:  Greater than or equal to 40 mg/dL    LDL Cholesterol  Desirable:  <100mg/dL  Above Desirable:  100-129 mg/dL   Borderline High:  130-159 mg/dL   High:  160-189 mg/dL   Very High:  >= 190 mg/dL    Non HDL Cholesterol  Desirable:  130 mg/dL  Above Desirable:  130-159 mg/dL  Borderline High:  160-189 mg/dL  High:  190-219 mg/dL  Very High:  Greater than or equal to 220 mg/dL   CBC with platelets   Result Value Ref Range    WBC Count 6.2 4.0 - 11.0 10e3/uL    RBC Count 5.23 4.40 - 5.90 10e6/uL    Hemoglobin 15.5 13.3 - 17.7 g/dL    Hematocrit 45.1 40.0 - 53.0 %    MCV 86 78 - 100 fL    MCH 29.6 26.5 - 33.0 pg    MCHC 34.4 31.5 - 36.5 g/dL    RDW 12.6 10.0 - 15.0 %    Platelet Count 253 150 - 450 10e3/uL   PSA, tumor marker   Result Value Ref Range    PSA Tumor Marker 0.02 0.00 - 4.50 ng/mL    Narrative    This result is obtained using the Roche Elecsys total PSA method on the alva e801 immunoassay analyzer. Results obtained with different assay methods or kits cannot be used interchangeably.       If you have any questions or concerns, please call the clinic at the number listed above.       Sincerely,      Andriy Rider MD

## 2023-03-29 NOTE — PROGRESS NOTES
"SUBJECTIVE:   Jaydon is a 63 year old who presents for Preventive Visit.  Additional Questions 3/29/2023   Roomed by Angelica Payne CMA   Accompanied by N/A   Patient has been advised of split billing requirements and indicates understanding: Yes  Are you in the first 12 months of your Medicare coverage?  Yes,  Visual Acuity:  Right Eye: 20/20   Left Eye: 20/25      Healthy Habits:     In general, how would you rate your overall health?  Good    Frequency of exercise:  2-3 days/week    Duration of exercise:  30-45 minutes    Do you usually eat at least 4 servings of fruit and vegetables a day, include whole grains    & fiber and avoid regularly eating high fat or \"junk\" foods?  No    Taking medications regularly:  Yes    Barriers to taking medications:  Not applicable    Medication side effects:  None    Ability to successfully perform activities of daily living:  No assistance needed    Home Safety:  No safety concerns identified    Hearing Impairment:  Difficulty following a conversation in a noisy restaurant or crowded room, need to ask people to speak up or repeat themselves and difficulty understanding soft or whispered speech    In the past 6 months, have you been bothered by leaking of urine? Yes    In general, how would you rate your overall mental or emotional health?  Excellent      PHQ-2 Total Score: 0    Additional concerns today:  No      Have you ever done Advance Care Planning? (For example, a Health Directive, POLST, or a discussion with a medical provider or your loved ones about your wishes): Yes, patient states has an Advance Care Planning document and will bring a copy to the clinic.       Fall risk  Fallen 2 or more times in the past year?: No  Any fall with injury in the past year?: No    Cognitive Screening   1) Repeat 3 items (Leader, Season, Table)    2) Clock draw: NORMAL  3) 3 item recall: Recalls 3 objects  Results: 3 items recalled: COGNITIVE IMPAIRMENT LESS LIKELY    Mini-CogTM " Copyright GUILLERMO Schmidt. Licensed by the author for use in [facility]; reprinted with permission (anisa@[email or web address]). All rights reserved.      Do you have sleep apnea, excessive snoring or daytime drowsiness?: no    Reviewed and updated as needed this visit by clinical staff   Tobacco  Allergies  Meds              Reviewed and updated as needed this visit by Provider                 Social History     Tobacco Use     Smoking status: Former     Types: Cigarettes     Quit date: 1994     Years since quittin.2     Smokeless tobacco: Former   Substance Use Topics     Alcohol use: No         Alcohol Use 3/29/2023   Prescreen: >3 drinks/day or >7 drinks/week? No   No flowsheet data found.  Do you have a current opioid prescription? No  Do you use any other controlled substances or medications that are not prescribed by a provider? None          Current providers sharing in care for this patient include:   Patient Care Team:  Andriy Rider MD as PCP - General (Family Practice)  Hector Luciano MD as Assigned PCP    The following health maintenance items are reviewed in Epic and correct as of today:  Health Maintenance   Topic Date Due     HIV SCREENING  Never done     HEPATITIS C SCREENING  Never done     ZOSTER IMMUNIZATION (1 of 2) Never done     COVID-19 Vaccine (3 - Booster for Pfizer series) 2021     INFLUENZA VACCINE (1) Never done     MEDICARE ANNUAL WELLNESS VISIT  2023     Pneumococcal Vaccine: 65+ Years (1 - PCV) 2023     ANNUAL REVIEW OF HM ORDERS  2024     FALL RISK ASSESSMENT  2024     LIPID  10/30/2025     COLORECTAL CANCER SCREENING  2027     ADVANCE CARE PLANNING  2028     DTAP/TDAP/TD IMMUNIZATION (3 - Td or Tdap) 2028     PHQ-2 (once per calendar year)  Completed     AORTIC ANEURYSM SCREENING (SYSTEM ASSIGNED)  Completed     IPV IMMUNIZATION  Aged Out     MENINGITIS IMMUNIZATION  Aged Out           Review of Systems   HENT: Positive  "for hearing loss.    Cardiovascular: Positive for peripheral edema.   Genitourinary: Positive for impotence.   Neurological: Positive for dizziness.         OBJECTIVE:   /83 (BP Location: Left arm, Patient Position: Sitting, Cuff Size: Adult Regular)   Pulse 62   Resp 14   Ht 1.803 m (5' 11\")   Wt 80.1 kg (176 lb 9.6 oz)   SpO2 99%   BMI 24.63 kg/m   Estimated body mass index is 24.63 kg/m  as calculated from the following:    Height as of this encounter: 1.803 m (5' 11\").    Weight as of this encounter: 80.1 kg (176 lb 9.6 oz).  Physical Exam  GENERAL: healthy, alert and no distress  EYES: Eyes grossly normal to inspection, PERRL and conjunctivae and sclerae normal  HENT: ear canals and TM's normal, nose and mouth without ulcers or lesions  NECK: no adenopathy, no asymmetry, masses, or scars and thyroid normal to palpation  RESP: lungs clear to auscultation - no rales, rhonchi or wheezes  CV: regular rate and rhythm, normal S1 S2, no S3 or S4, no murmur, click or rub, no peripheral edema and peripheral pulses strong  ABDOMEN: soft, nontender, no hepatosplenomegaly, no masses and bowel sounds normal  RECTAL: deferred  MS: no gross musculoskeletal defects noted, no edema  SKIN: no suspicious lesions or rashes  NEURO: Normal strength and tone, mentation intact and speech normal  PSYCH: mentation appears normal, affect normal/bright    Prostate Specific Antigen Screen   Date Value Ref Range Status   10/30/2020 <0.1 0.0 - 4.5 ng/mL Final       ASSESSMENT / PLAN:       ICD-10-CM    1. Encounter for annual wellness exam in Medicare patient  Z00.00 REVIEW OF HEALTH MAINTENANCE PROTOCOL ORDERS     Comprehensive metabolic panel (BMP + Alb, Alk Phos, ALT, AST, Total. Bili, TP)     Lipid panel     CBC with platelets      2. History of prostate cancer, stable Z85.46 PSA, tumor marker      3. Need for vaccination for Strep pneumoniae  Z23 PNEUMOCOCCAL 20 VALENT CONJUGATE (PREVNAR 20)      4. Colonic adenoma, " colonoscopy due in May 2027 D12.6         PLAN:   .     Check with insurance on Shingrix coverage    Prevnar 20 today    Fasting labs    Consider resumption of a statin due to family hx of ASCAD.  We will see what the lipid panel shows.     Colonoscopy due in May 2027    COUNSELING:  Reviewed preventive health counseling, as reflected in patient instructions       Regular exercise       Healthy diet/nutrition       Colon cancer screening       Prostate cancer screening        He reports that he quit smoking about 29 years ago. He has quit using smokeless tobacco.      Appropriate preventive services were discussed with this patient, including applicable screening as appropriate for cardiovascular disease, diabetes, osteopenia/osteoporosis, and glaucoma.  As appropriate for age/gender, discussed screening for colorectal cancer, prostate cancer, breast cancer, and cervical cancer. Checklist reviewing preventive services available has been given to the patient.    Reviewed patients plan of care and provided an AVS. The Basic Care Plan (routine screening as documented in Health Maintenance) for Jaydon meets the Care Plan requirement. This Care Plan has been established and reviewed with the Patient.      Andriy Rider MD  [facility]    Identified Health Risks:      The patient was counseled and encouraged to consider modifying their diet and eating habits. He was provided with information on recommended healthy diet options.  The patient was provided with written information regarding signs of hearing loss.  Information on urinary incontinence and treatment options given to patient.

## 2023-03-29 NOTE — PATIENT INSTRUCTIONS
Check with insurance on Shingrix coverage    Prevnar 20 today    Fasting labs    Consider resumption of a statin due to family hx of ASCAD.  We will see what the lipid panel shows.  Patient Education   Personalized Prevention Plan  You are due for the preventive services outlined below.  Your care team is available to assist you in scheduling these services.  If you have already completed any of these items, please share that information with your care team to update in your medical record.  Health Maintenance Due   Topic Date Due     HIV Screening  Never done     Hepatitis C Screening  Never done     Zoster (Shingles) Vaccine (1 of 2) Never done     COVID-19 Vaccine (3 - Booster for Pfizer series) 12/16/2021     Flu Vaccine (1) Never done     Annual Wellness Visit  01/27/2023       Understanding USDA MyPlate  The USDA has guidelines to help you make healthy food choices. These are called MyPlate. MyPlate shows the food groups that make up healthy meals using the image of a place setting. Before you eat, think about the healthiest choices for what to put on your plate or in your cup or bowl. To learn more about building a healthy plate, visit www.choosemyplate.gov.     The food groups    Fruits. Any fruit or 100% fruit juice counts as part of the Fruit Group. Fruits may be fresh, canned, frozen, or dried, and may be whole, cut-up, or pureed. Make 1/2 of your plate fruits and vegetables.    Vegetables. Any vegetable or 100% vegetable juice counts as a member of the Vegetable Group. Vegetables may be fresh, frozen, canned, or dried. They can be served raw or cooked and may be whole, cut-up, or mashed. Make 1/2 of your plate fruits and vegetables.    Grains. All foods made from grains are part of the Grains Group. These include wheat, rice, oats, cornmeal, and barley. Grains are often used to make foods such as bread, pasta, oatmeal, cereal, tortillas, and grits. Grains should be no more than 1/4 of your plate. At  least half of your grains should be whole grains.    Protein. This group includes meat, poultry, seafood, beans and peas, eggs, processed soy products (such as tofu), nuts (including nut butters), and seeds. Make protein choices no more than 1/4 of your plate. Meat and poultry choices should be lean or low fat.    Dairy. The Dairy Group includes all fluid milk products and foods made from milk that contain calcium, such as yogurt and cheese. (Foods that have little calcium, such as cream, butter, and cream cheese, are not part of this group.) Most dairy choices should be low-fat or fat-free.    Oils. Oils aren't a food group, but they do contain essential nutrients. However it's important to watch your intake of oils. These are fats that are liquid at room temperature. They include canola, corn, olive, soybean, vegetable, and sunflower oil. Foods that are mainly oil include mayonnaise, certain salad dressings, and soft margarines. You likely already get your daily oil allowance from the foods you eat.  Things to limit  Eating healthy also means limiting these things in your diet:    Salt (sodium). Many processed foods have a lot of sodium. To keep sodium intake down, eat fresh vegetables, meats, poultry, and seafood when possible. Purchase low-sodium, reduced-sodium, or no-salt-added food products at the store. And don't add salt to your meals at home. Instead, season them with herbs and spices such as dill, oregano, cumin, and paprika. Or try adding flavor with lemon or lime zest and juice.    Saturated fat. Saturated fats are most often found in animal products such as beef, pork, and chicken. They are often solid at room temperature, such as butter. To reduce your saturated fat intake, choose leaner cuts of meat and poultry. And try healthier cooking methods such as grilling, broiling, roasting, or baking. For a simple lower-fat swap, use plain nonfat yogurt instead of mayonnaise when making potato salad or  macaroni salad.    Added sugars. These are sugars added to foods. They are in foods such as ice cream, candy, soda, fruit drinks, sports drinks, energy drinks, cookies, pastries, jams, and syrups. Cut down on added sugars by sharing sweet treats with a family member or friend. You can also choose fruit for dessert, and drink water or other unsweetened beverages.  [employer] last reviewed this educational content on 6/1/2020 2000-2022 [employer]. All rights reserved. This information is not intended as a substitute for professional medical care. Always follow your healthcare professional's instructions.          Signs of Hearing Loss  Hearing loss is a problem shared by many people. In fact, it's one of the most common health problems, particularly as people age. Most people aged 65 and older have some hearing loss. By age 80, almost everyone does. Hearing loss often occurs slowly over the years. So, you may not realize your hearing has gotten worse.   When sudden hearing loss occurs, it's important to contact your healthcare provider right away. Your provider will do a medical exam and a hearing exam as soon as possible. This is to help find the cause and type of your sudden hearing loss. Based on your diagnosis, your healthcare provider will discuss possible treatments.      Hearing much better with one ear can be a sign of hearing loss.     Have your hearing checked  Call your healthcare provider if you:     Have to strain to hear normal conversation    Have to watch other people s faces very carefully to follow what they re saying    Need to ask people to repeat what they ve said    Often misunderstand what people are saying    Turn the volume of the television or radio up so high that others complain    Feel that people are mumbling when they re talking to you    Find that the effort to hear leaves you feeling tired and irritated    Notice, when using the phone, that you hear better with one ear  than the other  [employer] last reviewed this educational content on 6/1/2022 2000-2022 [employer]. All rights reserved. This information is not intended as a substitute for professional medical care. Always follow your healthcare professional's instructions.          Urinary Incontinence (Male)  We understand that gender is a spectrum. We may use gendered terms to talk about anatomy and health risk. Please use this sheet in a way that works best for you and your provider as you talk about your care.     Urinary incontinence means not being able to control the release of urine from the bladder.   Causes  Common causes of urinary incontinence in men include:    Infection    Certain medicines    Aging    Poor pelvic muscle tone    Bladder spasms    Obesity    Enlarged prostate    Trouble urinating and fully emptying the bladder (urinary retention)  Other things that can cause incontinence are:     Nervous system diseases    Diabetes    Sleep apnea    Urinary tract infections    Prostate surgery    Pelvic injury  Constipation and smoking have also been identified as risk factors.   Symptoms    Urge incontinence (overactive bladder). This is a sudden urge to urinate. It occurs even though there may not be much urine in the bladder. The need to urinate often during the night is common. It's due to overactive bladder muscles.    Stress incontinence. This is urine leakage that you can't control. It can occur with sneezing, coughing, and other actions that put stress on the bladder.    Treatment  Treatment depends on what is causing the condition. Bladder infections are treated with antibiotics. Urinary retention is treated with a bladder catheter.   Home care  Follow these guidelines when caring for yourself at home:    Don't have any foods and drinks that may irritate the bladder. This includes:  ? Chocolate  ? Alcohol  ? Caffeine  ? Carbonated drinks  ? Acidic fruits and juices    Limit fluids to 6 to 8  cups a day.    Lose weight if you are overweight. This may reduce your symptoms.    If advised, do regular pelvic muscle-strengthening exercises such as Kegel exercises.    If needed, wear absorbent pads to catch urine. Change the pads often. This is for good hygiene and to prevent skin and bladder infections.    Bathe daily for good hygiene.    If an antibiotic was prescribed to treat a bladder infection, take it until it's finished. Keep taking it even if you are feeling better. This is to make sure your infection has cleared.    If a catheter was left in place, keep bacteria from getting into the collection bag. Don't disconnect the catheter from the collection bag.    Use a leg band to secure the catheter drainage tube, so it does not pull on the catheter. Drain the collection bag when it becomes full. To do this, use the drain spout at the bottom of the bag. Don't disconnect the bag from the catheter.    Don't pull on or try to remove a catheter. The catheter must be removed by a healthcare provider.    If you smoke, stop. Ask your provider for help if you can't do this on your own.  Follow-up care  Follow up with your healthcare provider, or as advised.  When to get medical advice  Call your healthcare provider right away if any of these occur:     Fever over 100.4 F (38 C), or as directed by your provider    Bladder pain or fullness    Belly swelling, nausea, or vomiting    Back pain    Weakness, dizziness, or fainting    If a catheter was left in place, return if:  ? The catheter falls out  ? The catheter stops draining for 6 hours  ? Your urine gets cloudy or smells bad  [employer] last reviewed this educational content on 6/1/2022 2000-2022 [employer]. All rights reserved. This information is not intended as a substitute for professional medical care. Always follow your healthcare professional's instructions.

## 2023-05-19 ENCOUNTER — OFFICE VISIT (OUTPATIENT)
Dept: FAMILY MEDICINE | Facility: CLINIC | Age: 63
End: 2023-05-19
Payer: COMMERCIAL

## 2023-05-19 VITALS
SYSTOLIC BLOOD PRESSURE: 138 MMHG | RESPIRATION RATE: 16 BRPM | WEIGHT: 177.8 LBS | DIASTOLIC BLOOD PRESSURE: 82 MMHG | HEIGHT: 71 IN | HEART RATE: 58 BPM | BODY MASS INDEX: 24.89 KG/M2 | OXYGEN SATURATION: 99 %

## 2023-05-19 DIAGNOSIS — L40.9 PSORIASIS: ICD-10-CM

## 2023-05-19 DIAGNOSIS — C61 MALIGNANT NEOPLASM OF PROSTATE (H): ICD-10-CM

## 2023-05-19 DIAGNOSIS — S79.911S HIP INJURY, RIGHT, SEQUELA: Primary | ICD-10-CM

## 2023-05-19 PROCEDURE — 99214 OFFICE O/P EST MOD 30 MIN: CPT | Performed by: FAMILY MEDICINE

## 2023-05-19 RX ORDER — TRIAMCINOLONE ACETONIDE 5 MG/G
OINTMENT TOPICAL
Qty: 30 G | Refills: 0 | Status: SHIPPED | OUTPATIENT
Start: 2023-05-19

## 2023-05-19 NOTE — ASSESSMENT & PLAN NOTE
No improvement after 2 weeks with negative initial xray.  I recommend pt use crutches to take weight off the hip so it can heal and cut back on his work.  I also ordered MRI of right hip to evaluate for occult fracture.  Can alternate ibuprofen with Tylenol for pain PRN.
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
## 2023-11-22 ENCOUNTER — PATIENT OUTREACH (OUTPATIENT)
Dept: GASTROENTEROLOGY | Facility: CLINIC | Age: 63
End: 2023-11-22
Payer: COMMERCIAL

## 2024-01-04 ENCOUNTER — TRANSFERRED RECORDS (OUTPATIENT)
Dept: HEALTH INFORMATION MANAGEMENT | Facility: CLINIC | Age: 64
End: 2024-01-04

## 2024-01-22 ENCOUNTER — TRANSFERRED RECORDS (OUTPATIENT)
Dept: HEALTH INFORMATION MANAGEMENT | Facility: CLINIC | Age: 64
End: 2024-01-22

## 2024-01-24 ENCOUNTER — TRANSFERRED RECORDS (OUTPATIENT)
Dept: HEALTH INFORMATION MANAGEMENT | Facility: CLINIC | Age: 64
End: 2024-01-24

## 2024-04-26 ENCOUNTER — TRANSFERRED RECORDS (OUTPATIENT)
Dept: HEALTH INFORMATION MANAGEMENT | Facility: CLINIC | Age: 64
End: 2024-04-26

## 2024-05-11 ENCOUNTER — HEALTH MAINTENANCE LETTER (OUTPATIENT)
Age: 64
End: 2024-05-11

## 2024-07-30 ENCOUNTER — TRANSFERRED RECORDS (OUTPATIENT)
Dept: HEALTH INFORMATION MANAGEMENT | Facility: CLINIC | Age: 64
End: 2024-07-30

## 2024-10-31 ENCOUNTER — TRANSFERRED RECORDS (OUTPATIENT)
Dept: HEALTH INFORMATION MANAGEMENT | Facility: CLINIC | Age: 64
End: 2024-10-31

## 2025-03-13 ENCOUNTER — TRANSFERRED RECORDS (OUTPATIENT)
Dept: HEALTH INFORMATION MANAGEMENT | Facility: CLINIC | Age: 65
End: 2025-03-13

## 2025-05-17 ENCOUNTER — HEALTH MAINTENANCE LETTER (OUTPATIENT)
Age: 65
End: 2025-05-17

## 2025-05-22 PROCEDURE — 88305 TISSUE EXAM BY PATHOLOGIST: CPT | Mod: TC,ORL | Performed by: OTOLARYNGOLOGY

## 2025-05-23 ENCOUNTER — LAB REQUISITION (OUTPATIENT)
Dept: LAB | Facility: CLINIC | Age: 65
End: 2025-05-23
Payer: MEDICARE

## 2025-05-23 DIAGNOSIS — K13.21 LEUKOPLAKIA OF ORAL MUCOSA, INCLUDING TONGUE: ICD-10-CM

## 2025-05-28 LAB
PATH REPORT.COMMENTS IMP SPEC: NORMAL
PATH REPORT.COMMENTS IMP SPEC: NORMAL
PATH REPORT.FINAL DX SPEC: NORMAL
PATH REPORT.GROSS SPEC: NORMAL
PATH REPORT.MICROSCOPIC SPEC OTHER STN: NORMAL
PHOTO IMAGE: NORMAL

## 2025-05-28 PROCEDURE — 88305 TISSUE EXAM BY PATHOLOGIST: CPT | Mod: 26 | Performed by: PATHOLOGY

## 2025-07-24 ENCOUNTER — TRANSFERRED RECORDS (OUTPATIENT)
Dept: HEALTH INFORMATION MANAGEMENT | Facility: CLINIC | Age: 65
End: 2025-07-24
Payer: MEDICARE